# Patient Record
Sex: FEMALE | Race: WHITE | NOT HISPANIC OR LATINO | Employment: OTHER | URBAN - NONMETROPOLITAN AREA
[De-identification: names, ages, dates, MRNs, and addresses within clinical notes are randomized per-mention and may not be internally consistent; named-entity substitution may affect disease eponyms.]

---

## 2019-08-21 ENCOUNTER — OFFICE VISIT (OUTPATIENT)
Dept: SURGERY | Facility: CLINIC | Age: 74
End: 2019-08-21

## 2019-08-21 VITALS
SYSTOLIC BLOOD PRESSURE: 144 MMHG | HEART RATE: 72 BPM | RESPIRATION RATE: 16 BRPM | WEIGHT: 129 LBS | DIASTOLIC BLOOD PRESSURE: 74 MMHG | HEIGHT: 65 IN | BODY MASS INDEX: 21.49 KG/M2

## 2019-08-21 DIAGNOSIS — Z12.11 COLON CANCER SCREENING: ICD-10-CM

## 2019-08-21 DIAGNOSIS — R10.32 LLQ PAIN: Primary | ICD-10-CM

## 2019-08-21 PROCEDURE — 99203 OFFICE O/P NEW LOW 30 MIN: CPT | Performed by: SURGERY

## 2019-08-21 RX ORDER — MONTELUKAST SODIUM 10 MG/1
10 TABLET ORAL NIGHTLY
COMMUNITY

## 2019-08-21 RX ORDER — GABAPENTIN 400 MG/1
400 CAPSULE ORAL 3 TIMES DAILY
COMMUNITY

## 2019-08-21 RX ORDER — ALBUTEROL SULFATE 90 UG/1
2 AEROSOL, METERED RESPIRATORY (INHALATION) EVERY 4 HOURS PRN
COMMUNITY

## 2019-08-21 RX ORDER — HYDROCODONE BITARTRATE AND ACETAMINOPHEN 10; 325 MG/1; MG/1
1 TABLET ORAL EVERY 6 HOURS PRN
COMMUNITY

## 2019-08-21 RX ORDER — NAPROXEN 375 MG/1
375 TABLET ORAL 2 TIMES DAILY PRN
COMMUNITY

## 2019-08-21 RX ORDER — AZELASTINE HYDROCHLORIDE 0.5 MG/ML
1 SOLUTION/ DROPS OPHTHALMIC 2 TIMES DAILY
COMMUNITY

## 2019-08-21 RX ORDER — LISINOPRIL 20 MG/1
20 TABLET ORAL DAILY
COMMUNITY

## 2019-08-21 RX ORDER — LIDOCAINE 50 MG/G
OINTMENT TOPICAL
COMMUNITY

## 2019-08-21 NOTE — PROGRESS NOTES
Hannah Doss 74 y.o. female presents @ the req of Dr. Davidson for eval of c-scope. Pt reports last c-scope 5 yrs ago and LLQ pain.  + H/O diverticulosis.   Chief Complaint   Patient presents with   • Colonoscopy             HPI     Above-noted and agree.  This very pleasant 74-year-old female has had left lower quadrant tenderness for a week.  She has a history of diverticulosis and diverticulitis.  She has had 2 previous colonoscopies.  Her last colonoscopy was 5 years ago.  She denies any history of colon polyps.  She does not see blood when she goes.  She tolerates a regular diet without nausea or vomiting.  She has had multiple laparotomies through low midline incision.  She denies chest pain or shortness of breath.  She has no fevers or chills.  She has no other complaints.    Review of Systems   All other systems reviewed and are negative.            Current Outpatient Medications:   •  albuterol sulfate  (90 Base) MCG/ACT inhaler, Inhale 2 puffs Every 4 (Four) Hours As Needed for Wheezing., Disp: , Rfl:   •  azelastine (OPTIVAR) 0.05 % ophthalmic solution, 1 drop 2 (Two) Times a Day., Disp: , Rfl:   •  gabapentin (NEURONTIN) 400 MG capsule, Take 400 mg by mouth 3 (Three) Times a Day., Disp: , Rfl:   •  HYDROcodone-acetaminophen (NORCO)  MG per tablet, Take 1 tablet by mouth Every 6 (Six) Hours As Needed for Moderate Pain ., Disp: , Rfl:   •  ipratropium-albuterol (COMBIVENT RESPIMAT)  MCG/ACT inhaler, Inhale 1 puff 4 (Four) Times a Day As Needed for Wheezing., Disp: , Rfl:   •  lidocaine (XYLOCAINE) 5 % ointment, Apply  topically to the appropriate area as directed Every 2 (Two) Hours As Needed for Mild Pain ., Disp: , Rfl:   •  lisinopril (PRINIVIL,ZESTRIL) 20 MG tablet, Take 20 mg by mouth Daily., Disp: , Rfl:   •  montelukast (SINGULAIR) 10 MG tablet, Take 10 mg by mouth Every Night., Disp: , Rfl:   •  naproxen (NAPROSYN) 375 MG tablet, Take 375 mg by mouth 2 (Two) Times a Day As Needed  "for Mild Pain ., Disp: , Rfl:         Allergies   Allergen Reactions   • Quinolones Unknown (See Comments)     unknown   • Sulfa Antibiotics Unknown (See Comments)     unknown           Past Medical History:   Diagnosis Date   • Anxiety    • Arthritis    • Asthma    • Breast cyst    • Diverticulosis    • Emphysema lung (CMS/HCC)    • GERD (gastroesophageal reflux disease)    • Hypertension    • PTSD (post-traumatic stress disorder)    • PUD (peptic ulcer disease)    • Seasonal allergies            Past Surgical History:   Procedure Laterality Date   • APPENDECTOMY     • BACK SURGERY     • BREAST SURGERY     • CATARACT EXTRACTION     • COLONOSCOPY     • EAR TUBES     • HYSTERECTOMY     • KNEE SURGERY     • SINUS SURGERY     • TONSILLECTOMY     • TOTAL HIP ARTHROPLASTY             Social History     Tobacco Use   • Smoking status: Never Smoker   • Smokeless tobacco: Never Used   Substance Use Topics   • Alcohol use: Yes     Frequency: Monthly or less   • Drug use: Not on file             There is no immunization history on file for this patient.        Physical Exam   Constitutional: She is oriented to person, place, and time. She appears well-developed and well-nourished.   HENT:   Head: Normocephalic and atraumatic.   Right Ear: External ear normal.   Left Ear: External ear normal.   Cardiovascular: Normal rate and regular rhythm.   Pulmonary/Chest: Effort normal and breath sounds normal.   Abdominal: Soft. Bowel sounds are normal.   Musculoskeletal: She exhibits no edema or deformity.   Neurological: She is alert and oriented to person, place, and time.   Skin: Skin is warm and dry.   Psychiatric: She has a normal mood and affect. Her behavior is normal.   Nursing note and vitals reviewed.      Debilities/Disabilities Identified: None    Emotional Behavior: Appropriate      /74   Pulse 72   Resp 16   Ht 165.1 cm (65\")   Wt 58.5 kg (129 lb)   BMI 21.47 kg/m²         Hannah was seen today for " colonoscopy.    Diagnoses and all orders for this visit:    LLQ pain    Colon cancer screening    I discussed with the patient the benefits and risks of performing endoscopy.  Benefits and risks were not limited to but including bleeding, infection, perforation, complications of anesthesia, aspiration.  The patient appeared to understand and is willing to proceed.    Thank you for allowing me to participate in the care of this interesting patient.

## 2019-09-16 ENCOUNTER — ANESTHESIA EVENT (OUTPATIENT)
Dept: PERIOP | Facility: HOSPITAL | Age: 74
End: 2019-09-16

## 2019-09-17 ENCOUNTER — HOSPITAL ENCOUNTER (OUTPATIENT)
Facility: HOSPITAL | Age: 74
Setting detail: HOSPITAL OUTPATIENT SURGERY
Discharge: HOME | End: 2019-09-17
Attending: SURGERY | Admitting: SURGERY

## 2019-09-17 ENCOUNTER — ANESTHESIA (OUTPATIENT)
Dept: PERIOP | Facility: HOSPITAL | Age: 74
End: 2019-09-17

## 2019-09-17 VITALS
TEMPERATURE: 97.8 F | BODY MASS INDEX: 21.43 KG/M2 | RESPIRATION RATE: 20 BRPM | DIASTOLIC BLOOD PRESSURE: 81 MMHG | HEART RATE: 56 BPM | WEIGHT: 128.6 LBS | HEIGHT: 65 IN | OXYGEN SATURATION: 97 % | SYSTOLIC BLOOD PRESSURE: 182 MMHG

## 2019-09-17 DIAGNOSIS — Z12.11 COLON CANCER SCREENING: ICD-10-CM

## 2019-09-17 DIAGNOSIS — R10.32 LLQ PAIN: ICD-10-CM

## 2019-09-17 PROCEDURE — 88305 TISSUE EXAM BY PATHOLOGIST: CPT | Performed by: SURGERY

## 2019-09-17 PROCEDURE — 25010000002 PROPOFOL 10 MG/ML EMULSION: Performed by: NURSE ANESTHETIST, CERTIFIED REGISTERED

## 2019-09-17 PROCEDURE — 45380 COLONOSCOPY AND BIOPSY: CPT | Performed by: SURGERY

## 2019-09-17 RX ORDER — MAGNESIUM HYDROXIDE 1200 MG/15ML
LIQUID ORAL AS NEEDED
Status: DISCONTINUED | OUTPATIENT
Start: 2019-09-17 | End: 2019-09-17 | Stop reason: HOSPADM

## 2019-09-17 RX ORDER — ESTRADIOL 0.03 MG/D
1 FILM, EXTENDED RELEASE TRANSDERMAL 2 TIMES WEEKLY
Status: ON HOLD | COMMUNITY
End: 2019-09-17

## 2019-09-17 RX ORDER — SODIUM CHLORIDE 0.9 % (FLUSH) 0.9 %
3 SYRINGE (ML) INJECTION EVERY 12 HOURS SCHEDULED
Status: DISCONTINUED | OUTPATIENT
Start: 2019-09-17 | End: 2019-09-17 | Stop reason: HOSPADM

## 2019-09-17 RX ORDER — SODIUM CHLORIDE 0.9 % (FLUSH) 0.9 %
1-10 SYRINGE (ML) INJECTION AS NEEDED
Status: DISCONTINUED | OUTPATIENT
Start: 2019-09-17 | End: 2019-09-17 | Stop reason: HOSPADM

## 2019-09-17 RX ORDER — SODIUM CHLORIDE, SODIUM LACTATE, POTASSIUM CHLORIDE, CALCIUM CHLORIDE 600; 310; 30; 20 MG/100ML; MG/100ML; MG/100ML; MG/100ML
100 INJECTION, SOLUTION INTRAVENOUS CONTINUOUS
Status: DISCONTINUED | OUTPATIENT
Start: 2019-09-17 | End: 2019-09-17 | Stop reason: HOSPADM

## 2019-09-17 RX ORDER — SODIUM CHLORIDE 9 MG/ML
40 INJECTION, SOLUTION INTRAVENOUS AS NEEDED
Status: DISCONTINUED | OUTPATIENT
Start: 2019-09-17 | End: 2019-09-17 | Stop reason: HOSPADM

## 2019-09-17 RX ORDER — SODIUM CHLORIDE, SODIUM LACTATE, POTASSIUM CHLORIDE, CALCIUM CHLORIDE 600; 310; 30; 20 MG/100ML; MG/100ML; MG/100ML; MG/100ML
9 INJECTION, SOLUTION INTRAVENOUS CONTINUOUS PRN
Status: DISCONTINUED | OUTPATIENT
Start: 2019-09-17 | End: 2019-09-17 | Stop reason: HOSPADM

## 2019-09-17 RX ORDER — PROPOFOL 10 MG/ML
VIAL (ML) INTRAVENOUS AS NEEDED
Status: DISCONTINUED | OUTPATIENT
Start: 2019-09-17 | End: 2019-09-17 | Stop reason: SURG

## 2019-09-17 RX ORDER — LIDOCAINE HYDROCHLORIDE 10 MG/ML
0.5 INJECTION, SOLUTION EPIDURAL; INFILTRATION; INTRACAUDAL; PERINEURAL ONCE AS NEEDED
Status: COMPLETED | OUTPATIENT
Start: 2019-09-17 | End: 2019-09-17

## 2019-09-17 RX ORDER — LIDOCAINE HYDROCHLORIDE 20 MG/ML
INJECTION, SOLUTION INFILTRATION; PERINEURAL AS NEEDED
Status: DISCONTINUED | OUTPATIENT
Start: 2019-09-17 | End: 2019-09-17 | Stop reason: SURG

## 2019-09-17 RX ORDER — ONDANSETRON 2 MG/ML
4 INJECTION INTRAMUSCULAR; INTRAVENOUS ONCE AS NEEDED
Status: DISCONTINUED | OUTPATIENT
Start: 2019-09-17 | End: 2019-09-17 | Stop reason: HOSPADM

## 2019-09-17 RX ADMIN — SODIUM CHLORIDE, POTASSIUM CHLORIDE, SODIUM LACTATE AND CALCIUM CHLORIDE 9 ML/HR: 600; 310; 30; 20 INJECTION, SOLUTION INTRAVENOUS at 09:11

## 2019-09-17 RX ADMIN — PROPOFOL 90 MG: 10 INJECTION, EMULSION INTRAVENOUS at 09:26

## 2019-09-17 RX ADMIN — PROPOFOL 30 MG: 10 INJECTION, EMULSION INTRAVENOUS at 09:54

## 2019-09-17 RX ADMIN — PROPOFOL 30 MG: 10 INJECTION, EMULSION INTRAVENOUS at 09:58

## 2019-09-17 RX ADMIN — LIDOCAINE HYDROCHLORIDE 100 MG: 20 INJECTION, SOLUTION INFILTRATION; PERINEURAL at 09:26

## 2019-09-17 RX ADMIN — PROPOFOL 40 MG: 10 INJECTION, EMULSION INTRAVENOUS at 09:36

## 2019-09-17 RX ADMIN — LIDOCAINE HYDROCHLORIDE 0.05 ML: 10 INJECTION, SOLUTION EPIDURAL; INFILTRATION; INTRACAUDAL; PERINEURAL at 09:11

## 2019-09-17 RX ADMIN — PROPOFOL 40 MG: 10 INJECTION, EMULSION INTRAVENOUS at 09:33

## 2019-09-17 RX ADMIN — PROPOFOL 30 MG: 10 INJECTION, EMULSION INTRAVENOUS at 09:37

## 2019-09-17 RX ADMIN — PROPOFOL 50 MG: 10 INJECTION, EMULSION INTRAVENOUS at 09:40

## 2019-09-17 RX ADMIN — PROPOFOL 50 MG: 10 INJECTION, EMULSION INTRAVENOUS at 09:29

## 2019-09-17 RX ADMIN — PROPOFOL 20 MG: 10 INJECTION, EMULSION INTRAVENOUS at 09:44

## 2019-09-17 RX ADMIN — PROPOFOL 20 MG: 10 INJECTION, EMULSION INTRAVENOUS at 09:48

## 2019-09-17 NOTE — ANESTHESIA POSTPROCEDURE EVALUATION
Patient: Hannah Doss    Procedure Summary     Date:  09/17/19 Room / Location:  [facility] ENDOSCOPY 1 /  [facility]    Anesthesia Start:  0922 Anesthesia Stop:  1010    Procedure:  COLONOSCOPY with  polypectomy (N/A ) Diagnosis:       LLQ pain      Colon cancer screening      (LLQ pain [R10.32])      (Colon cancer screening [Z12.11])    Surgeon:  Clarice Hopkins DO Provider:  Cyndee Silva CRNA    Anesthesia Type:  MAC ASA Status:  2          Anesthesia Type: MAC  Last vitals  BP   166/91 (09/17/19 1020)   Temp   97.8 °F (36.6 °C) (09/17/19 1013)   Pulse   60 (09/17/19 1020)   Resp   20 (09/17/19 1020)     SpO2   100 % (09/17/19 1020)     Post Anesthesia Care and Evaluation    Patient location during evaluation: PHASE II  Patient participation: complete - patient participated  Level of consciousness: awake and alert  Pain score: 0  Pain management: adequate  Airway patency: patent  Anesthetic complications: No anesthetic complications  PONV Status: none  Cardiovascular status: acceptable  Respiratory status: acceptable  Hydration status: acceptable

## 2019-09-17 NOTE — OP NOTE
Colonoscopy Procedure Note      Pre-operative Diagnosis: Left lower quadrant pain    Post-operative Diagnosis: Diverticulosis, polyps of the transverse colon, left colon, sigmoid colon    Procedure: Colonoscopy with polypectomy using cold forceps    Surgeon: Kellie    Anesthetic: MAC     Estimated Blood Loss: Minimal    Complications: None    Indications: Left lower quadrant pain    Findings/Treatments:   Polyps x2 of the transverse colon-removed with cold forceps  Left colon polyps x2-removed with cold forceps  Sigmoid colon polyp-removed with cold forceps       Scope Withdrawal Time:  > 6 minutes      Recommendations: Await pathology      Procedure Details     After discussing the benefits and risks of the procedure with the patient, not limited to but including:  Bleeding, infection, perforation, aspiration; informed consent was signed.  The patient was taken into the endoscopy room at [facility] and placed in the left lateral decubitus position.  MAC anesthesia was given with appropriate cardiopulmonary monitoring.  A rectal exam was performed.  Sphincter tone was normal.  The colonoscope was then inserted and carefully advanced to the cecum while visualizing the mucosa.  The cecum was identified by the ileocecal valve and the orifice of the appendix.  Once in the cecum, the scope was withdrawn while carefully evaluating the mucosa deflating air.  There were 2 flat polyps in the transverse colon that were removed with cold forceps.  The scope was then withdrawn to the left colon where there were 2 small polyps removed cold forceps.  In the sigmoid colon there was diverticulosis and a polyp removed cold forceps.  The scope was withdrawn in the rectum and retroflexed and straightened.  The scope was removed and Hannah was taken to the recovery area in stable postoperative condition having tolerated her procedure well.    Clarice Hopkins DO

## 2019-09-17 NOTE — INTERVAL H&P NOTE
"  H&P reviewed. The patient was examined and there are no changes to the H&P.     BP (!) 182/77 (BP Location: Left arm, Patient Position: Lying)   Pulse 56   Temp 97.9 °F (36.6 °C) (Oral)   Resp 20   Ht 165.1 cm (65\")   Wt 58.3 kg (128 lb 9.6 oz)   SpO2 98%   BMI 21.40 kg/m²         "

## 2019-09-17 NOTE — ANESTHESIA PREPROCEDURE EVALUATION
Anesthesia Evaluation     Patient summary reviewed and Nursing notes reviewed   no history of anesthetic complications:  NPO Solid Status: > 8 hours  NPO Liquid Status: > 8 hours           Airway   Mallampati: II  TM distance: >3 FB  Neck ROM: full  No difficulty expected  Dental - normal exam     Pulmonary - normal exam    breath sounds clear to auscultation  (+) COPD (uses albuterol multiple times a day) moderate, asthma,     ROS comment: Room Air SaO2 98    Emphysema  Cardiovascular - normal exam  Exercise tolerance: good (4-7 METS)    ECG reviewed  Rhythm: regular  Rate: normal    (+) hypertension well controlled less than 2 medications,     ROS comment: Abn EKG per pt, EKG completed at Dr Edson Davidson's office in last 2 months.  Pt states she needs a stress test.Shows atrial septal infarct, pt denies an sequalae    Neuro/Psych  (+) psychiatric history (PTSD (post-traumatic stress disorder)) Anxiety,     GI/Hepatic/Renal/Endo    (+)  GERD well controlled,      Musculoskeletal     (+) back pain, chronic pain, neck pain,   Abdominal  - normal exam   Substance History      OB/GYN          Other   (+) arthritis                   Anesthesia Plan    ASA 2     MAC     Anesthetic plan, all risks, benefits, and alternatives have been provided, discussed and informed consent has been obtained with: patient.  Use of blood products discussed with patient  Consented to blood products.

## 2019-09-18 LAB
LAB AP CASE REPORT: NORMAL
PATH REPORT.FINAL DX SPEC: NORMAL
PATH REPORT.GROSS SPEC: NORMAL

## 2019-10-07 ENCOUNTER — OFFICE VISIT (OUTPATIENT)
Dept: SURGERY | Facility: CLINIC | Age: 74
End: 2019-10-07

## 2019-10-07 DIAGNOSIS — K57.90 DIVERTICULOSIS: Primary | ICD-10-CM

## 2019-10-07 DIAGNOSIS — K63.5 POLYP OF COLON, UNSPECIFIED PART OF COLON, UNSPECIFIED TYPE: ICD-10-CM

## 2019-10-07 PROCEDURE — 99213 OFFICE O/P EST LOW 20 MIN: CPT | Performed by: SURGERY

## 2019-10-07 NOTE — PROGRESS NOTES
Hannah Doss 74 y.o. female presents for PO FU c-scope.  Pt feels well.        HPI   Above noted and agree.  Hannah had diverticulosis and colon polyps.  She is doing well.  She moves her bowels almost daily.  She says she does not eat a lot of fiber.  She has no pain and has no complaints.      Review of Systems        Past Medical History:   Diagnosis Date   • Anxiety    • Arthritis    • Asthma    • Breast cyst    • Diverticulosis    • Emphysema lung (CMS/HCC)    • GERD (gastroesophageal reflux disease)    • Hypertension    • PTSD (post-traumatic stress disorder)    • PUD (peptic ulcer disease)    • Seasonal allergies            Past Surgical History:   Procedure Laterality Date   • APPENDECTOMY     • BACK SURGERY     • BREAST SURGERY     • CATARACT EXTRACTION     • COLONOSCOPY     • COLONOSCOPY N/A 9/17/2019    Procedure: COLONOSCOPY with  polypectomy;  Surgeon: Clarice Hopkins DO;  Location: [facility];  Service: Gastroenterology   • EAR TUBES     • HYSTERECTOMY     • KNEE SURGERY     • SINUS SURGERY     • TONSILLECTOMY     • TOTAL HIP ARTHROPLASTY             Physical Exam   Constitutional: She is oriented to person, place, and time. She appears well-developed and well-nourished.   HENT:   Head: Normocephalic and atraumatic.   Right Ear: External ear normal.   Left Ear: External ear normal.   Musculoskeletal: She exhibits no edema or deformity.   Neurological: She is alert and oriented to person, place, and time.   Skin: Skin is warm and dry.   Psychiatric: She has a normal mood and affect. Her behavior is normal.           There were no vitals taken for this visit.        Hannah was seen today for post-op follow-up.    Diagnoses and all orders for this visit:    Diverticulosis    Polyp of colon, unspecified part of colon, unspecified type    I gave Hannah and information sheet on high-fiber diet and information sheet on diverticulosis.  She will need a 3-year repeat colonoscopy but may return anytime  sooner.    Thank you for allowing me to participate in the care of this interesting patient.

## 2022-08-08 ENCOUNTER — TELEPHONE (OUTPATIENT)
Dept: SURGERY | Facility: CLINIC | Age: 77
End: 2022-08-08

## 2024-07-11 ENCOUNTER — APPOINTMENT (OUTPATIENT)
Dept: CT IMAGING | Facility: HOSPITAL | Age: 79
End: 2024-07-11
Payer: MEDICARE

## 2024-07-11 ENCOUNTER — HOSPITAL ENCOUNTER (EMERGENCY)
Facility: HOSPITAL | Age: 79
Discharge: HOME | End: 2024-07-11
Attending: STUDENT IN AN ORGANIZED HEALTH CARE EDUCATION/TRAINING PROGRAM
Payer: MEDICARE

## 2024-07-11 VITALS
BODY MASS INDEX: 20.83 KG/M2 | HEIGHT: 65 IN | HEART RATE: 76 BPM | WEIGHT: 125 LBS | RESPIRATION RATE: 16 BRPM | OXYGEN SATURATION: 94 % | SYSTOLIC BLOOD PRESSURE: 182 MMHG | TEMPERATURE: 98 F | DIASTOLIC BLOOD PRESSURE: 82 MMHG

## 2024-07-11 DIAGNOSIS — R10.32 LEFT LOWER QUADRANT ABDOMINAL PAIN: Primary | ICD-10-CM

## 2024-07-11 DIAGNOSIS — N30.01 ACUTE CYSTITIS WITH HEMATURIA: ICD-10-CM

## 2024-07-11 LAB
ALBUMIN SERPL-MCNC: 4.5 G/DL (ref 3.5–5.2)
ALBUMIN/GLOB SERPL: 1.6 G/DL
ALP SERPL-CCNC: 66 U/L (ref 39–117)
ALT SERPL W P-5'-P-CCNC: 9 U/L (ref 1–33)
ANION GAP SERPL CALCULATED.3IONS-SCNC: 13.7 MMOL/L (ref 5–15)
AST SERPL-CCNC: 19 U/L (ref 1–32)
BACTERIA UR QL AUTO: ABNORMAL /HPF
BASOPHILS # BLD AUTO: 0.05 10*3/MM3 (ref 0–0.2)
BASOPHILS NFR BLD AUTO: 0.7 % (ref 0–1.5)
BILIRUB SERPL-MCNC: 0.5 MG/DL (ref 0–1.2)
BILIRUB UR QL STRIP: NEGATIVE
BUN SERPL-MCNC: 6 MG/DL (ref 8–23)
BUN/CREAT SERPL: 8.7 (ref 7–25)
CALCIUM SPEC-SCNC: 10 MG/DL (ref 8.6–10.5)
CHLORIDE SERPL-SCNC: 93 MMOL/L (ref 98–107)
CLARITY UR: CLEAR
CO2 SERPL-SCNC: 24.3 MMOL/L (ref 22–29)
COLOR UR: ABNORMAL
CREAT SERPL-MCNC: 0.69 MG/DL (ref 0.57–1)
DEPRECATED RDW RBC AUTO: 41.1 FL (ref 37–54)
EGFRCR SERPLBLD CKD-EPI 2021: 89.5 ML/MIN/1.73
EOSINOPHIL # BLD AUTO: 0.06 10*3/MM3 (ref 0–0.4)
EOSINOPHIL NFR BLD AUTO: 0.9 % (ref 0.3–6.2)
ERYTHROCYTE [DISTWIDTH] IN BLOOD BY AUTOMATED COUNT: 13.2 % (ref 12.3–15.4)
GLOBULIN UR ELPH-MCNC: 2.9 GM/DL
GLUCOSE SERPL-MCNC: 127 MG/DL (ref 65–99)
GLUCOSE UR STRIP-MCNC: NEGATIVE MG/DL
HCT VFR BLD AUTO: 37.5 % (ref 34–46.6)
HGB BLD-MCNC: 12.8 G/DL (ref 12–15.9)
HGB UR QL STRIP.AUTO: ABNORMAL
HOLD SPECIMEN: NORMAL
HYALINE CASTS UR QL AUTO: ABNORMAL /LPF
IMM GRANULOCYTES # BLD AUTO: 0.02 10*3/MM3 (ref 0–0.05)
IMM GRANULOCYTES NFR BLD AUTO: 0.3 % (ref 0–0.5)
KETONES UR QL STRIP: ABNORMAL
LEUKOCYTE ESTERASE UR QL STRIP.AUTO: ABNORMAL
LIPASE SERPL-CCNC: 22 U/L (ref 13–60)
LYMPHOCYTES # BLD AUTO: 1.47 10*3/MM3 (ref 0.7–3.1)
LYMPHOCYTES NFR BLD AUTO: 22 % (ref 19.6–45.3)
MCH RBC QN AUTO: 29 PG (ref 26.6–33)
MCHC RBC AUTO-ENTMCNC: 34.1 G/DL (ref 31.5–35.7)
MCV RBC AUTO: 84.8 FL (ref 79–97)
MONOCYTES # BLD AUTO: 0.49 10*3/MM3 (ref 0.1–0.9)
MONOCYTES NFR BLD AUTO: 7.3 % (ref 5–12)
NEUTROPHILS NFR BLD AUTO: 4.58 10*3/MM3 (ref 1.7–7)
NEUTROPHILS NFR BLD AUTO: 68.8 % (ref 42.7–76)
NITRITE UR QL STRIP: NEGATIVE
NRBC BLD AUTO-RTO: 0 /100 WBC (ref 0–0.2)
PH UR STRIP.AUTO: 7 [PH] (ref 4.5–8)
PLATELET # BLD AUTO: 264 10*3/MM3 (ref 140–450)
PMV BLD AUTO: 10.6 FL (ref 6–12)
POTASSIUM SERPL-SCNC: 4 MMOL/L (ref 3.5–5.2)
PROT SERPL-MCNC: 7.4 G/DL (ref 6–8.5)
PROT UR QL STRIP: NEGATIVE
RBC # BLD AUTO: 4.42 10*6/MM3 (ref 3.77–5.28)
RBC # UR STRIP: ABNORMAL /HPF
REF LAB TEST METHOD: ABNORMAL
SODIUM SERPL-SCNC: 131 MMOL/L (ref 136–145)
SP GR UR STRIP: <=1.005 (ref 1–1.03)
SQUAMOUS #/AREA URNS HPF: ABNORMAL /HPF
UROBILINOGEN UR QL STRIP: ABNORMAL
WBC # UR STRIP: ABNORMAL /HPF
WBC NRBC COR # BLD AUTO: 6.67 10*3/MM3 (ref 3.4–10.8)

## 2024-07-11 PROCEDURE — 85025 COMPLETE CBC W/AUTO DIFF WBC: CPT | Performed by: PHYSICIAN ASSISTANT

## 2024-07-11 PROCEDURE — 81001 URINALYSIS AUTO W/SCOPE: CPT | Performed by: PHYSICIAN ASSISTANT

## 2024-07-11 PROCEDURE — 80053 COMPREHEN METABOLIC PANEL: CPT | Performed by: PHYSICIAN ASSISTANT

## 2024-07-11 PROCEDURE — 96365 THER/PROPH/DIAG IV INF INIT: CPT

## 2024-07-11 PROCEDURE — 83690 ASSAY OF LIPASE: CPT | Performed by: PHYSICIAN ASSISTANT

## 2024-07-11 PROCEDURE — 74177 CT ABD & PELVIS W/CONTRAST: CPT

## 2024-07-11 PROCEDURE — 25510000001 IOPAMIDOL PER 1 ML: Performed by: STUDENT IN AN ORGANIZED HEALTH CARE EDUCATION/TRAINING PROGRAM

## 2024-07-11 PROCEDURE — 25810000003 SODIUM CHLORIDE 0.9 % SOLUTION: Performed by: PHYSICIAN ASSISTANT

## 2024-07-11 PROCEDURE — 25010000002 CEFTRIAXONE PER 250 MG: Performed by: PHYSICIAN ASSISTANT

## 2024-07-11 PROCEDURE — 99285 EMERGENCY DEPT VISIT HI MDM: CPT

## 2024-07-11 RX ORDER — SODIUM CHLORIDE 0.9 % (FLUSH) 0.9 %
10 SYRINGE (ML) INJECTION AS NEEDED
Status: DISCONTINUED | OUTPATIENT
Start: 2024-07-11 | End: 2024-07-12 | Stop reason: HOSPADM

## 2024-07-11 RX ORDER — CEPHALEXIN 500 MG/1
500 CAPSULE ORAL 4 TIMES DAILY
Qty: 40 CAPSULE | Refills: 0 | Status: SHIPPED | OUTPATIENT
Start: 2024-07-11 | End: 2024-07-21

## 2024-07-11 RX ADMIN — IOPAMIDOL 100 ML: 755 INJECTION, SOLUTION INTRAVENOUS at 21:01

## 2024-07-11 RX ADMIN — SODIUM CHLORIDE 1000 MG: 9 INJECTION, SOLUTION INTRAVENOUS at 20:29

## 2024-07-11 RX ADMIN — SODIUM CHLORIDE 1000 ML: 9 INJECTION, SOLUTION INTRAVENOUS at 20:28

## 2024-07-11 NOTE — ED PROVIDER NOTES
Subjective   History of Present Illness  Patient is a 79-year-old female with send left lower quadrant pain for about 6 months.  It started after she had a colonoscopy at [facility] by Dr. Blackman.  She says she never followed up and does not know the results of that colonoscopy.  The reason she had it was because it was time to have it and she was not actually having any colon symptoms.  She is not having vomiting, diarrhea, constipation, loss of blood.  She denies  symptoms including discharge and dysuria.    She also noticed a rash on her right forearm since Sunday.  About that time she started taking omeprazole and she wonders if it was an allergic reaction or TEN.      Review of Systems   Constitutional: Negative.    Gastrointestinal:  Positive for abdominal pain. Negative for blood in stool, constipation, diarrhea, nausea and vomiting.   Genitourinary: Negative.    Skin:  Positive for rash.   All other systems reviewed and are negative.      Past Medical History:   Diagnosis Date    Anxiety     Arthritis     Asthma     Breast cyst     Diverticulosis     Emphysema lung     GERD (gastroesophageal reflux disease)     Hypertension     PTSD (post-traumatic stress disorder)     PUD (peptic ulcer disease)     Seasonal allergies        Allergies   Allergen Reactions    Quinolones Unknown (See Comments)     unknown    Sulfa Antibiotics Unknown (See Comments)     unknown       Past Surgical History:   Procedure Laterality Date    APPENDECTOMY      BACK SURGERY      BREAST SURGERY      CATARACT EXTRACTION      COLONOSCOPY      COLONOSCOPY N/A 9/17/2019    Procedure: COLONOSCOPY with  polypectomy;  Surgeon: Clarice Hopkins DO;  Location: [facility] OR;  Service: Gastroenterology    EAR TUBES      HYSTERECTOMY      KNEE SURGERY      SINUS SURGERY      TONSILLECTOMY      TOTAL HIP ARTHROPLASTY         Family History   Problem Relation Age of Onset    Cancer Sister        Social History     Socioeconomic History    Marital  status: Legally    Tobacco Use    Smoking status: Never    Smokeless tobacco: Never   Substance and Sexual Activity    Alcohol use: Yes    Drug use: No    Sexual activity: Defer           Objective   Physical Exam  Vitals reviewed.   Constitutional:       Appearance: She is well-developed.   Cardiovascular:      Rate and Rhythm: Normal rate and regular rhythm.   Pulmonary:      Effort: Pulmonary effort is normal.      Breath sounds: Normal breath sounds.   Abdominal:      General: Abdomen is flat. Bowel sounds are normal. There is no distension.      Palpations: Abdomen is soft.      Tenderness: There is abdominal tenderness in the left lower quadrant. There is no right CVA tenderness, left CVA tenderness or guarding. Negative signs include Alston's sign and McBurney's sign.   Skin:     General: Skin is warm and dry.      Comments: Area of rough skin that is not discolored or red on the proximal right forearm near the lateral epicondyle   Neurological:      Mental Status: She is alert.   Psychiatric:         Mood and Affect: Mood normal.         Behavior: Behavior normal.         Procedures           ED Course                                             Medical Decision Making  Firstly the area of skin patient is concerned about is a rough patch that I can appreciate on the proximal right radius area.  There is no redness or signs of infection.  Low suspicion this is from an allergic reaction or something serious such as TEN.    As for the abdominal pain has been going on for about 6 months the patient says started after a routine colonoscopy.  She never found out about the results of her colonoscopy.  She has some tenderness in the left lower quadrant but otherwise benign abdominal exam.  She denies  symptoms.  She denies vomiting, diarrhea, constipation, loss of blood.    Blood significant for low sodium and chloride, given IV fluids.  She appears to have a UTI.  Gave her some Rocephin here.  CT is  pending and care to Dr. Baldwin at shift change.    Amount and/or Complexity of Data Reviewed  Labs: ordered.  Radiology: ordered.    Risk  Prescription drug management.        Final diagnoses:   Left lower quadrant abdominal pain       ED Disposition  ED Disposition       ED Disposition   Discharge    Condition   Stable    Comment   --               No follow-up provider specified.       Medication List      No changes were made to your prescriptions during this visit.            Chanel Gilbert PA-C  07/11/24 2007

## 2024-07-12 NOTE — ED PROVIDER NOTES
Attending note:    Sid Baldwin DO-    I assumed care of this patient at 20:00, agree with history and physical previously performed.  Patient is pending CT scan evaluation at signout for final disposition.  Patient does have noted leukocytes on urinalysis and on CT scan, the patient's evaluation does have urinary bladder cystitis with diverticulosis and no evidence of diverticulitis.  She has no evidence of leukocytosis.  Her chemistry is otherwise stable.  The patient does have some contaminant in her urinalysis however, I will discharge with cephalexin 4 times daily and she will follow-up with PCP.  Stable for discharge at this time.      The patient has been given very strict return precautions to return to the emergency department should there be any acute change or worsening of their condition.  I have explained my findings and the patient has expressed understanding to me.  I explained that the work-up performed in the ED has been based on the specific complaint and concern, as the nature of emergency medicine is complaint driven and they understand that new symptoms may arise.  I have told them that, should there be any new symptoms, worsening or changing symptoms, a new work-up may be indicated that they are encouraged to return to the emergency department or promptly contact their primary care physician. We have employed a shared decision-making process as the discussion of their disposition.  The patient has been educated as to the nature of the visit, the tests and work-up performed and the findings from today's visit. At this time, there does not appear to be any acute emergent process that necessitates admission to the hospital, however, the patient understands that this can change unexpectedly. At this time, the patient is stable for discharge home and agrees to follow-up with her primary care physician in the next 24 to 48 hours or earlier should they be able to obtain an appointment.    The  patient was counseled regarding diagnostic results and treatment plan and patient has indicated understanding of these instructions.       Sid Baldwin,   07/11/24 2617

## 2024-07-12 NOTE — DISCHARGE INSTRUCTIONS

## (undated) DEVICE — MASK,FACE,FLUID RESIST,SHLD,EARLOOP: Brand: MEDLINE

## (undated) DEVICE — Device: Brand: DEFENDO AIR/WATER/SUCTION AND BIOPSY VALVE

## (undated) DEVICE — SUCTION CANISTER, 3000CC,SAFELINER: Brand: DEROYAL

## (undated) DEVICE — SYR LL 3CC

## (undated) DEVICE — VIAL FORMALIN CAP 10P 40ML

## (undated) DEVICE — SPNG GZ WOVN 4X4IN 12PLY 10/BX STRL

## (undated) DEVICE — Device

## (undated) DEVICE — GOWN ISOL W/THUMB UNIV BLU BX/15

## (undated) DEVICE — FRCP BX RADJAW4 NDL 2.8 240CM LG OG BX40

## (undated) DEVICE — BW-412T DISP COMBO CLEANING BRUSH: Brand: SINGLE USE COMBINATION CLEANING BRUSH